# Patient Record
Sex: FEMALE | Race: WHITE | Employment: STUDENT | URBAN - METROPOLITAN AREA
[De-identification: names, ages, dates, MRNs, and addresses within clinical notes are randomized per-mention and may not be internally consistent; named-entity substitution may affect disease eponyms.]

---
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
## 2025-05-30 ENCOUNTER — OFFICE VISIT (OUTPATIENT)
Dept: INTERNAL MEDICINE | Age: 20
End: 2025-05-30
Payer: COMMERCIAL

## 2025-05-30 VITALS
OXYGEN SATURATION: 99 % | HEART RATE: 92 BPM | DIASTOLIC BLOOD PRESSURE: 64 MMHG | BODY MASS INDEX: 24.13 KG/M2 | WEIGHT: 145 LBS | TEMPERATURE: 98 F | SYSTOLIC BLOOD PRESSURE: 110 MMHG

## 2025-05-30 DIAGNOSIS — Z00.00 ENCOUNTER FOR WELL ADULT EXAM WITHOUT ABNORMAL FINDINGS: Primary | ICD-10-CM

## 2025-05-30 DIAGNOSIS — E06.3 HYPOTHYROIDISM DUE TO HASHIMOTO THYROIDITIS: ICD-10-CM

## 2025-05-30 PROBLEM — A41.9 SEPSIS (HCC): Status: RESOLVED | Noted: 2024-09-05 | Resolved: 2025-05-30

## 2025-05-30 PROBLEM — R39.9 UTI SYMPTOMS: Status: RESOLVED | Noted: 2024-06-27 | Resolved: 2025-05-30

## 2025-05-30 PROCEDURE — 99395 PREV VISIT EST AGE 18-39: CPT | Performed by: STUDENT IN AN ORGANIZED HEALTH CARE EDUCATION/TRAINING PROGRAM

## 2025-05-30 PROCEDURE — G8420 CALC BMI NORM PARAMETERS: HCPCS | Performed by: STUDENT IN AN ORGANIZED HEALTH CARE EDUCATION/TRAINING PROGRAM

## 2025-05-30 PROCEDURE — 99213 OFFICE O/P EST LOW 20 MIN: CPT | Performed by: STUDENT IN AN ORGANIZED HEALTH CARE EDUCATION/TRAINING PROGRAM

## 2025-05-30 PROCEDURE — G8427 DOCREV CUR MEDS BY ELIG CLIN: HCPCS | Performed by: STUDENT IN AN ORGANIZED HEALTH CARE EDUCATION/TRAINING PROGRAM

## 2025-05-30 PROCEDURE — 1036F TOBACCO NON-USER: CPT | Performed by: STUDENT IN AN ORGANIZED HEALTH CARE EDUCATION/TRAINING PROGRAM

## 2025-05-30 RX ORDER — LEVOTHYROXINE SODIUM 50 UG/1
50 TABLET ORAL DAILY
Qty: 90 TABLET | Refills: 3 | Status: SHIPPED | OUTPATIENT
Start: 2025-05-30 | End: 2026-05-25

## 2025-05-30 ASSESSMENT — PATIENT HEALTH QUESTIONNAIRE - PHQ9
SUM OF ALL RESPONSES TO PHQ QUESTIONS 1-9: 0
2. FEELING DOWN, DEPRESSED OR HOPELESS: NOT AT ALL
1. LITTLE INTEREST OR PLEASURE IN DOING THINGS: NOT AT ALL

## 2025-05-30 NOTE — PROGRESS NOTES
Well Adult Note  Name: Crystal Hughes Today’s Date: 2025   MRN: [id number] Sex: Female   Age: 20 y.o. Ethnicity: Non- / Non    : 2005 Race: White (non-)      Crystal Hughes is here for a well adult exam.          Assessment & Plan  Well Adult   Overall patient doing well. No acute complaints. Screening labs ordered. UTD on other screening. Discussed whole food diet. Discussed exercise- goal of 150 min/week.   - Active at job but does not engage in formal exercise.  - Advised to incorporate walking into daily routine, aiming for 150 minutes per week.  - Encouraged to engage in weightlifting or resistance exercises three times a week.  - Blood work ordered as part of routine health maintenance.    Hashimoto's disease:  Chronic, controlled   - Patient has been on Synthroid and missed a day or two of medication recently.  - No significant issues reported with periods, though occasionally skips a month.  - Blood work ordered to monitor thyroid levels.  - Prescription refill for Synthroid sent to the pharmacy.  - Will inform patient if lab results indicate a need for dosage adjustment.      Encounter for well adult exam without abnormal findings  -     CBC with Auto Differential; Future  -     Basic Metabolic Panel w/ Reflex to MG; Future  Hypothyroidism due to Hashimoto thyroiditis  -     levothyroxine (SYNTHROID) 50 MCG tablet; Take 1 tablet by mouth daily, Disp-90 tablet, R-3Normal  -     TSH reflex to FT4; Future  -     T4, Free; Future  Hypothyroidism, unspecified type    Results  Labs   - TPO test: Indicates Hashimoto's disease     No follow-ups on file.     Subjective   History of Present Illness  The patient presents for a medication refill and blood work.    She has been diagnosed with hypothyroidism since the age of 11, specifically Hashimoto's autoimmune thyroiditis, with no history of thyroid surgery. She reports occasional missed doses of her Synthroid medication. There is a

## 2025-06-03 ASSESSMENT — ENCOUNTER SYMPTOMS
ABDOMINAL PAIN: 0
SHORTNESS OF BREATH: 0